# Patient Record
Sex: MALE | Race: OTHER | NOT HISPANIC OR LATINO
[De-identification: names, ages, dates, MRNs, and addresses within clinical notes are randomized per-mention and may not be internally consistent; named-entity substitution may affect disease eponyms.]

---

## 2021-04-05 ENCOUNTER — APPOINTMENT (OUTPATIENT)
Dept: DISASTER EMERGENCY | Facility: OTHER | Age: 66
End: 2021-04-05
Payer: COMMERCIAL

## 2021-04-05 PROCEDURE — 0001A: CPT

## 2021-04-26 ENCOUNTER — APPOINTMENT (OUTPATIENT)
Dept: DISASTER EMERGENCY | Facility: OTHER | Age: 66
End: 2021-04-26
Payer: COMMERCIAL

## 2021-04-26 PROCEDURE — 0002A: CPT

## 2024-05-01 ENCOUNTER — APPOINTMENT (OUTPATIENT)
Dept: PULMONOLOGY | Facility: CLINIC | Age: 69
End: 2024-05-01
Payer: MEDICARE

## 2024-05-01 VITALS
HEART RATE: 73 BPM | BODY MASS INDEX: 29.86 KG/M2 | DIASTOLIC BLOOD PRESSURE: 80 MMHG | TEMPERATURE: 97.7 F | HEIGHT: 68 IN | WEIGHT: 197 LBS | OXYGEN SATURATION: 97 % | SYSTOLIC BLOOD PRESSURE: 110 MMHG

## 2024-05-01 PROCEDURE — 99203 OFFICE O/P NEW LOW 30 MIN: CPT

## 2024-05-01 RX ORDER — DOXYCYCLINE HYCLATE 100 MG/1
100 CAPSULE ORAL
Qty: 14 | Refills: 0 | Status: ACTIVE | COMMUNITY
Start: 2024-05-01 | End: 1900-01-01

## 2024-05-01 NOTE — HISTORY OF PRESENT ILLNESS
[Never] : never [TextBox_4] : 68 year old patient presents for evaluation of chronic cough.  he has had cough for many years, now worse.   He has chest congestion and is producing yellow sputum.  Denies  history of obstructive airway disease denies pneumonia    He denies significant dyspnea.  Denies sinus problems, postnasal drip GERD   Primary doctor is Dr Neri Corbett     PSH:  hip repl  coronary stent       PMH:  CAD  stent, on ASA  clopidogrel HLD  HTN  on metoprolol  not on ACE I    Thal minor   SH:   never smoker   ETOH:  occasional     Occupation: retired, worked as doorman  Born [facility] No exposure to chemicals, dust, asbestos, mold        ALLERGY:   NKDA     environmental/seasonal allergy: honey, not pollen      Review of Systems:   No rash, skin problems No dry eyes no mouth ulcers no sinusitis, sinus infections, nasal obstruction no dysphagia no dry mouth      no obstructive airway disease  no pneumonia no wheeze no lung cancer    no chest pain no murmur no CHF  no edema   no peptic ulcer or gastritis no GERD no abdominal pain no liver disease   no Diabetes no thyroid disease    no bleeding   no DVT or PE   no kidney disease   no stroke no seizure

## 2024-05-01 NOTE — DISCUSSION/SUMMARY
[FreeTextEntry1] : 68 year old man who has chronic cough for many years Now presents due to increased cough, discolored sputum, congestion  He has prominen  rhonchi at left base, lesser on right.  No  history of obstructive airway disease had CXR may years ago and as told of small abnormality that  cleared on subsequent X ray (employment study)     he also had recent CX[place] 4[place]12[place]24 [place] [facility] [place] was  unremarkable by report  (Symptoms worsened afterward)  he also states that he took Z pack less than 2 months ago  has CAD with stent  Rue out bronchiectasis, rule out pneumonia  PLAN  Repeat CXR. CT chest i f needed  sputum culture   Based on CXR findings and report that symptoms have worsened, i will treat empirically with doxycycline 100 mg BID for 1 week, observe effect  close follow up  Further recommendations based on results.  Total time spent : 40 minutes Including: Preparation prior to visit - Reviewing prior record, results of tests and Consultation Reports as applicable Conducting an appropriate H & P during today's encounter Counseling patient  Note completion  med renewal discussing differential diagnosis    Paul Duron MD

## 2024-05-01 NOTE — PHYSICAL EXAM
[No Acute Distress] : no acute distress [Well Nourished] : well nourished [Well Developed] : well developed [Normal Oropharynx] : normal oropharynx [I] : Mallampati Class: I [Normal Appearance] : normal appearance [Supple] : supple [No Neck Mass] : no neck mass [No JVD] : no jvd [Normal Rate/Rhythm] : normal rate/rhythm [Normal S1, S2] : normal s1, s2 [No Resp Distress] : no resp distress [Benign] : benign [Not Tender] : not tender [No Masses] : no masses [No Edema] : no edema [No Focal Deficits] : no focal deficits [Oriented x3] : oriented x3 [Normal Affect] : normal affect [TextBox_68] : congested cough, rhonchi at left base, few at right base, possible mild wheeze

## 2024-05-03 ENCOUNTER — TRANSCRIPTION ENCOUNTER (OUTPATIENT)
Age: 69
End: 2024-05-03

## 2024-05-04 LAB — BACTERIA SPT CULT: NORMAL

## 2024-05-14 ENCOUNTER — APPOINTMENT (OUTPATIENT)
Dept: PULMONOLOGY | Facility: CLINIC | Age: 69
End: 2024-05-14
Payer: MEDICARE

## 2024-05-14 VITALS
OXYGEN SATURATION: 96 % | TEMPERATURE: 97.7 F | SYSTOLIC BLOOD PRESSURE: 106 MMHG | DIASTOLIC BLOOD PRESSURE: 70 MMHG | BODY MASS INDEX: 29.95 KG/M2 | WEIGHT: 197 LBS | HEART RATE: 83 BPM

## 2024-05-14 DIAGNOSIS — J98.59 OTHER DISEASES OF MEDIASTINUM, NOT ELSEWHERE CLASSIFIED: ICD-10-CM

## 2024-05-14 DIAGNOSIS — J20.9 ACUTE BRONCHITIS, UNSPECIFIED: ICD-10-CM

## 2024-05-14 PROCEDURE — 99214 OFFICE O/P EST MOD 30 MIN: CPT

## 2024-05-14 RX ORDER — AMOXICILLIN AND CLAVULANATE POTASSIUM 875; 125 MG/1; MG/1
875-125 TABLET, COATED ORAL
Qty: 14 | Refills: 0 | Status: ACTIVE | COMMUNITY
Start: 2024-05-14 | End: 1900-01-01

## 2024-05-14 RX ORDER — GUAIFENESIN 400 MG/1
400 TABLET ORAL
Qty: 30 | Refills: 0 | Status: ACTIVE | COMMUNITY
Start: 2024-05-14 | End: 1900-01-01

## 2024-05-14 RX ORDER — GREEN TEA/HOODIA GORDONII 315-12.5MG
10 CAPSULE ORAL
Qty: 30 | Refills: 0 | Status: ACTIVE | COMMUNITY
Start: 2024-05-14 | End: 1900-01-01

## 2024-05-14 NOTE — HISTORY OF PRESENT ILLNESS
[Never] : never [TextBox_4] : 68 year old patient presents for evaluation of chronic cough.  he has had cough for many years, now worse.   He has chest congestion and is producing yellow sputum.  Denies  history of obstructive airway disease denies pneumonia    He denies significant dyspnea.  Denies sinus problems, postnasal drip GERD   Primary doctor is Dr Neri Corbett     PSH:  hip repl  coronary stent       PMH:  CAD  stent, on ASA  clopidogrel HLD  HTN  on metoprolol  not on ACE I    Thal minor   SH:   never smoker   ETOH:  occasional     Occupation: retired, worked as doorman  Born [facility] No exposure to chemicals, dust, asbestos, mold        ALLERGY:   NKDA     environmental/seasonal allergy: honey, not pollen      Review of Systems:   No rash, skin problems No dry eyes no mouth ulcers no sinusitis, sinus infections, nasal obstruction no dysphagia no dry mouth      no obstructive airway disease  no pneumonia no wheeze no lung cancer    no chest pain no murmur no CHF  no edema   no peptic ulcer or gastritis no GERD no abdominal pain no liver disease   no Diabetes no thyroid disease    no bleeding   no DVT or PE   no kidney disease   no stroke no seizure

## 2024-05-14 NOTE — DISCUSSION/SUMMARY
[FreeTextEntry1] : 68 year old man who has chronic cough for many years Now presented due to increased cough, discolored sputum, congestion  He has prominent  rhonchi at left base, lesser on right.  No  history of obstructive airway disease had CXR may years ago and as told of small abnormality that  cleared on subsequent X ray (employment study)     he also had recent CXR 4/12/24 per records he provided At [facility] [place] This was  unremarkable by report  (Symptoms worsened afterward)  he also states that he took Z pack less than 2 months ago  has CAD with stent  Rue out bronchiectasis, rule out pneumonia   He was  treated empirically with doxycycline 100 mg BID for 1 week, observe effect   CXR repeated 05/01/24 and was  unremarkable  sputum culture  was  unremarkable   He states he has had no improvement  per my review of CXR it appears the right hilum is full/dense.  Rule out underlying mass Denies sinus symptoms  PLAN  Obtain NC CT chest   Treat with Amoxicillin/clavulanate  for 5-7 days   use expectorant reassess  close follow up  Further recommendations based on results.   discussed at length with the patient   Total time spent : 30 minutes Including: Preparation prior to visit - Reviewing prior record, results of tests and Consultation Reports as applicable Conducting an appropriate H & P during today's encounter Counseling patient  Note completion  med renewal discussing differential diagnosis    Paul Duron MD

## 2024-06-12 ENCOUNTER — LABORATORY RESULT (OUTPATIENT)
Age: 69
End: 2024-06-12

## 2024-06-12 ENCOUNTER — APPOINTMENT (OUTPATIENT)
Dept: PULMONOLOGY | Facility: CLINIC | Age: 69
End: 2024-06-12
Payer: MEDICARE

## 2024-06-12 VITALS
WEIGHT: 195 LBS | DIASTOLIC BLOOD PRESSURE: 74 MMHG | HEART RATE: 79 BPM | SYSTOLIC BLOOD PRESSURE: 120 MMHG | BODY MASS INDEX: 29.65 KG/M2 | OXYGEN SATURATION: 96 % | TEMPERATURE: 97 F

## 2024-06-12 DIAGNOSIS — R59.0 LOCALIZED ENLARGED LYMPH NODES: ICD-10-CM

## 2024-06-12 DIAGNOSIS — J18.9 PNEUMONIA, UNSPECIFIED ORGANISM: ICD-10-CM

## 2024-06-12 PROCEDURE — 99214 OFFICE O/P EST MOD 30 MIN: CPT

## 2024-06-12 NOTE — HISTORY OF PRESENT ILLNESS
[TextBox_4] : 68 year old patient presents for evaluation of chronic cough.  he has had cough for many years, now worse.   He has chest congestion and is producing yellow sputum.  Denies  history of obstructive airway disease denies pneumonia    He denies significant dyspnea.  Denies sinus problems, postnasal drip GERD   Primary doctor is Dr Neri Corbett     PSH:  hip repl  coronary stent       PMH:  CAD  stent, on ASA  clopidogrel HLD  HTN  on metoprolol  not on ACE I    Thal minor   SH:   never smoker   ETOH:  occasional     Occupation: retired, worked as doorman  Born [facility] No exposure to chemicals, dust, asbestos, mold        ALLERGY:   NKDA     environmental/seasonal allergy: honey, not pollen      Review of Systems:   No rash, skin problems No dry eyes no mouth ulcers no sinusitis, sinus infections, nasal obstruction no dysphagia no dry mouth      no obstructive airway disease  no pneumonia no wheeze no lung cancer    no chest pain no murmur no CHF  no edema   no peptic ulcer or gastritis no GERD no abdominal pain no liver disease   no Diabetes no thyroid disease    no bleeding   no DVT or PE   no kidney disease   no stroke no seizure

## 2024-06-12 NOTE — PROCEDURE
[FreeTextEntry1] : BRIAN FINNEY Date of Birth 1955 Procedure CT CHEST w/o contrast Study Date & Time 2024-05-23 5:03 PM Patient ID [id number] Accession Number [id number] Referring Physician DENISEALEXIS SHARMIN Institution Name [id number] Report RADIOLOGY REPORT FINDINGS FINDING PROCEDURE: CT CHEST WITHOUT CONTRAST CLINICAL INDICATION: Mediastinal mass and pulmonary nodules. TECHNIQUE: Non-ECG-gated spiral axial images of the chest were obtained without intravenous contrast. This study was performed using one or more of the following dose reduction techniques: automated exposure control, adjustment of the mA and/or kv according to patient size, or use of iterative reconstruction technique. COMPARISONS: Chest CT examination performed on August 13, 2019 and multiple prior studies dating back to the chest CT study performed on January 23, 2019. FINDINGS: Non-Cardiovascular Mediastinum: The imaged unenhanced thyroid gland, whose evaluation is limited due to beam hardening and streak artifact, is grossly unchanged. No pathologically enlarged lymph nodes are identified within the axillae, filomena, or mediastinum on this examination performed in the absence of intravenous contrast (multiple sub-centimeter lymph nodes throughout the thorax do not meet size criteria for enlargement and have not significantly changed since the prior study). Cardiovascular Mediastinum: Coronary arterial and aortic atherosclerotic calcifications are present, as are aortic valvular and mitral annular calcifications. The heart size is at the upper limit of normal. There is no pericardial effusion. Airways/Lungs/Pleura: The trachea and mainstem bronchi are grossly patent; however, more peripherally there is multi-focal endobronchial mucoid impaction. The lungs demonstrate multiple mild to moderate clustered reticulonodular pulmonary opacities most predominantly within the left lower lobe (with associated mild pulmonary parenchymal scarring, architectural distortion, and bronchiectasis), findings that 6/12/24, 2:21 PM Syn[employer] Mob[employer] [email or web address] have overall increased since August 13, 2019. A partially calcified 14 mm right upper lobe pulmonary nodule (series 4, image 73) has not significantly changed since August 13, 2019 and is most likely benign (possibly a pulmonary hamartoma). Multiple other pulmonary and fissural nodules measuring up to 4 mm in size are unchanged since August 13, 2019 and are most likely benign; as one selected example, there is an unchanged 4 mm nodule along the right major fissure (series 4, image 161). There are multiple sub-centimeter calcified granulomas within the lungs. There is no pleural effusion or pneumothorax on either side. Upper Abdomen: Limited evaluation of the partially imaged unenhanced upper abdomen demonstrates no acute abnormality. Bones and Soft Tissues: Diffuse osteopenia is noted. There are mild to moderate degenerative changes of the thoracic vertebral column. No suspicious lytic or blastic lesion is identified. IMPRESSION: Multiple mild to moderate clustered reticulonodular pulmonary opacities most predominantly within the left lower lobe (with associated mild pulmonary parenchymal scarring, architectural distortion, and bronchiectasis), findings that have overall increased since August 13, 2019 and for which the differential diagnosis includes (possibly acute superimposed upon chronic/recurrent) aspiration and/or bronchiolitis in the appropriate clinical context; please note that a follow-up low-dose chest CT examination without intravenous contrast is recommended in three to six months or as clinically indicated. Electronically signed by: Adam Bernheim MD 5/24/2024 9:32 AM Workstation: [id number] Electronically Signed By: Bernheim MD, Adam Sign Date: 24-MAY-24 [facility] Radiology

## 2024-06-12 NOTE — DISCUSSION/SUMMARY
[FreeTextEntry1] : 68 year old man who has chronic cough for many years Now presented due to increased cough, discolored sputum, congestion  He has prominent  rhonchi at left base, lesser on right.  No  history of obstructive airway disease had CXR may years ago and as told of small abnormality that  cleared on subsequent X ray (employment study)     he also had recent CXR 4/12/24 per records he provided At [facility] [place]? This was  unremarkable by report  (Symptoms worsened afterward)  he also states that he took Z pack less than 2 months ago  has CAD with stent  Rue out bronchiectasis, rule out pneumonia   He was  treated empirically with doxycycline 100 mg BID for 1 week, observe effect and subsequently with amoxicillin/clavulanate  he has improved sifnificantly  he had CT chest 5/24/24 that demonstrated multiple mild to moderate clustered reticulonodular pulmonary opacities most predominantly within the left lower lobe (with associated mild pulmonary parenchymal scarring, architectural distortion, and bronchiectasis), findings that have overall increased since August 13, 2019 and for which the differential diagnosis includes (possibly acute superimposed upon chronic/recurrent) aspiration and/or bronchiolitis   Ther is chronic stable RUL nodule likely benign  sputum culture  was  unremarkable   PLAN  use expectorant obtain blood work for allergies, hypersensitivity panel  bird feather antibody  panel  close follow up  repeat CT chest in several months  Further recommendations based on results.   discussed at length with the patient   Total time spent : 30 minutes Including: Preparation prior to visit - Reviewing prior record, results of tests and Consultation Reports as applicable Conducting an appropriate H & P during today's encounter Counseling patient  Note completion  med renewal discussing differential diagnosis    Paul Duron MD

## 2024-06-13 LAB
A ALTERNATA IGE QN: <0.1 KUA/L
A FUMIGATUS IGE QN: <0.1 KUA/L
ANISOCYTOSIS BLD QL: NORMAL
BASOPHILS # BLD AUTO: 0.09 K/UL
BASOPHILS NFR BLD AUTO: 0.9 %
BUDGERIGAR FEATHER (E78) CLASS: 0
BUDGERIGAR FEATHER (E78) CONC: <0.1 KUA/L
C ALBICANS IGE QN: <0.1 KUA/L
C HERBARUM IGE QN: <0.1 KUA/L
CAT DANDER IGE QN: <0.1 KUA/L
CHICKEN FEATHER IGE QN: <0.1 KUA/L
COMMON RAGWEED IGE QN: <0.1 KUA/L
D FARINAE IGE QN: 0.59 KUA/L
D PTERONYSS IGE QN: 0.15 KUA/L
DEPRECATED A ALTERNATA IGE RAST QL: 0 (ref 0–?)
DEPRECATED A FUMIGATUS IGE RAST QL: 0 (ref 0–?)
DEPRECATED C ALBICANS IGE RAST QL: 0
DEPRECATED C HERBARUM IGE RAST QL: 0 (ref 0–?)
DEPRECATED CAT DANDER IGE RAST QL: 0 (ref 0–?)
DEPRECATED CHICKEN FEATHER IGE RAST QL: 0
DEPRECATED COMMON RAGWEED IGE RAST QL: 0 (ref 0–?)
DEPRECATED D FARINAE IGE RAST QL: 1 (ref 0–?)
DEPRECATED DOG DANDER IGE RAST QL: 0 (ref 0–?)
DEPRECATED DUCK FEATHER IGE RAST QL: 0
DEPRECATED GOOSE FEATHER IGE RAST QL: 0
DEPRECATED M RACEMOSUS IGE RAST QL: 0
DEPRECATED ROACH IGE RAST QL: 2 (ref 0–?)
DEPRECATED TIMOTHY IGE RAST QL: NORMAL (ref 0–?)
DEPRECATED WHITE OAK IGE RAST QL: 1 (ref 0–?)
DOG DANDER IGE QN: <0.1 KUA/L
DUCK FEATHER IGE QN: <0.1 KUA/L
ELLIPTOCYTES BLD QL SMEAR: NORMAL
EOSINOPHIL # BLD AUTO: 0.34 K/UL
EOSINOPHIL # BLD MANUAL: 540 /UL
EOSINOPHIL NFR BLD AUTO: 3.4 %
GOOSE FEATHER IGE QN: <0.1 KUA/L
HCT VFR BLD CALC: 41.6 %
HGB BLD-MCNC: 12.8 G/DL
LYMPHOCYTES # BLD AUTO: 3.96 K/UL
LYMPHOCYTES NFR BLD AUTO: 39.3 %
M RACEMOSUS IGE QN: <0.1 KUA/L
MCHC RBC-ENTMCNC: 18.1 PG
MCHC RBC-ENTMCNC: 30.8 GM/DL
MCV RBC AUTO: 58.8 FL
MICROCYTES BLD QL SMEAR: NORMAL
MONOCYTES # BLD AUTO: 0.69 K/UL
MONOCYTES NFR BLD AUTO: 6.8 %
MSMEAR: NORMAL
NEUTROPHILS # BLD AUTO: 5 K/UL
NEUTROPHILS NFR BLD AUTO: 49.6 %
PLAT MORPH BLD: NORMAL
PLATELET # BLD AUTO: 202 K/UL
POIKILOCYTOSIS BLD QL SMEAR: NORMAL
RBC # BLD: 7.07 M/UL
RBC # FLD: 20.2 %
RBC BLD AUTO: ABNORMAL
ROACH IGE QN: 1.61 KUA/L
SCHISTOCYTES BLD QL SMEAR: SLIGHT
TIMOTHY IGE QN: 0.32 KUA/L
TOTAL IGE SMQN RAST: 185 KU/L
WBC # FLD AUTO: 10.08 K/UL
WHITE OAK IGE QN: 0.66 KUA/L

## 2024-06-14 LAB — DEPRECATED D PTERONYSS IGE RAST QL: NORMAL (ref 0–?)

## 2024-06-17 LAB
ALTERN TENCAPG(M6): 19.1 MCG/ML
ASPER FUMCAPG(M3): 66.3 MCG/ML
AUREOBASCAPG(M12): 16.6 MCG/ML
LACEYELLA SACCHARI IGG: 23.5 MCG/ML
MICROPOLYCAPG(M22): 15.3 MCG/ML
PENIC CHRYCAPG(M1): 59.5 MCG/ML
PHOMA BETAE IGG: 20.7 MCG/ML
TRICHODERMA VIRIDE IGG: 20.2 MCG/ML

## 2024-07-03 ENCOUNTER — APPOINTMENT (OUTPATIENT)
Dept: PULMONOLOGY | Facility: CLINIC | Age: 69
End: 2024-07-03
Payer: MEDICARE

## 2024-07-03 VITALS
SYSTOLIC BLOOD PRESSURE: 128 MMHG | DIASTOLIC BLOOD PRESSURE: 70 MMHG | WEIGHT: 196 LBS | HEART RATE: 84 BPM | TEMPERATURE: 96.8 F | OXYGEN SATURATION: 96 % | BODY MASS INDEX: 29.8 KG/M2

## 2024-07-03 DIAGNOSIS — J47.9 BRONCHIECTASIS, UNCOMPLICATED: ICD-10-CM

## 2024-07-03 DIAGNOSIS — J18.9 PNEUMONIA, UNSPECIFIED ORGANISM: ICD-10-CM

## 2024-07-03 DIAGNOSIS — T78.40XA ALLERGY, UNSPECIFIED, INITIAL ENCOUNTER: ICD-10-CM

## 2024-07-03 DIAGNOSIS — Z77.29 CONTACT WITH AND (SUSPECTED) EXPOSURE TO OTHER HAZARDOUS SUBSTANCES: ICD-10-CM

## 2024-07-03 PROCEDURE — 99215 OFFICE O/P EST HI 40 MIN: CPT

## 2024-07-03 RX ORDER — PREDNISONE 20 MG/1
20 TABLET ORAL DAILY
Qty: 5 | Refills: 0 | Status: ACTIVE | COMMUNITY
Start: 2024-07-03 | End: 1900-01-01

## 2024-07-04 PROBLEM — Z77.29 LONG-TERM EXPOSURE INVOLVING BIRD DROPPINGS: Status: ACTIVE | Noted: 2024-07-04

## 2024-07-04 PROBLEM — T78.40XA ALLERGY: Status: ACTIVE | Noted: 2024-07-04

## 2024-07-17 LAB — ACID FAST STN SPT: NORMAL

## 2024-07-22 LAB — ACID FAST STN SPT: ABNORMAL

## 2024-08-04 NOTE — REASON FOR VISIT
[Follow-Up] : a follow-up visit [Pneumonia] : pneumonia [Cough] : cough [Bronchiectasis] : bronchiectasis

## 2024-08-05 ENCOUNTER — APPOINTMENT (OUTPATIENT)
Dept: PULMONOLOGY | Facility: CLINIC | Age: 69
End: 2024-08-05

## 2024-08-05 PROBLEM — R91.1 INCIDENTAL LUNG NODULE, GREATER THAN OR EQUAL TO 8MM: Status: ACTIVE | Noted: 2024-08-05

## 2024-08-05 PROBLEM — A31.0 PULMONARY MAI (MYCOBACTERIUM AVIUM-INTRACELLULARE) INFECTION: Status: ACTIVE | Noted: 2024-08-05

## 2024-08-05 PROCEDURE — 99215 OFFICE O/P EST HI 40 MIN: CPT

## 2024-08-05 NOTE — HISTORY OF PRESENT ILLNESS
[Never] : never [TextBox_4] : 69 year old patient presents for evaluation of chronic cough.  he has had cough for many years, now worse.   He has chest congestion and is producing yellow sputum.  Denies  history of obstructive airway disease denies pneumonia    He denies significant dyspnea.  Denies sinus problems, postnasal drip GERD   Primary doctor is Dr Neri Corbett     PSH:  hip repl  coronary stent       PMH:  CAD  stent, on ASA  clopidogrel HLD  HTN  on metoprolol  not on ACE I    Thal minor   SH:   never smoker   ETOH:  occasional     Occupation: retired, worked as doorman  Born [facility] No exposure to chemicals, dust, asbestos, mold        ALLERGY:   NKDA     environmental/seasonal allergy: honey, not pollen      Review of Systems:   No rash, skin problems No dry eyes no mouth ulcers no sinusitis, sinus infections, nasal obstruction no dysphagia no dry mouth      no obstructive airway disease  no pneumonia no wheeze no lung cancer    no chest pain no murmur no CHF  no edema   no peptic ulcer or gastritis no GERD no abdominal pain no liver disease   no Diabetes no thyroid disease    no bleeding   no DVT or PE   no kidney disease   no stroke no seizure

## 2024-08-05 NOTE — HISTORY OF PRESENT ILLNESS
[Never] : never [TextBox_4] : 69 year old patient presents for evaluation of chronic cough.  he has had cough for many years, now worse.   He has chest congestion and is producing yellow sputum.  Denies  history of obstructive airway disease denies pneumonia    He denies significant dyspnea.  Denies sinus problems, postnasal drip GERD   Primary doctor is Dr Neri Corbett     PSH:  hip repl  coronary stent       PMH:  CAD  stent, on ASA  clopidogrel HLD  HTN  on metoprolol  not on ACE I    Thal minor   SH:   never smoker   ETOH:  occasional     Occupation: retired, worked as doorman  Born [facility] No exposure to chemicals, dust, asbestos, mold        ALLERGY:   NKDA     environmental/seasonal allergy: honey, not pollen      Review of Systems:   No rash, skin problems No dry eyes no mouth ulcers no sinusitis, sinus infections, nasal obstruction no dysphagia no dry mouth      no obstructive airway disease  no pneumonia no wheeze no lung cancer    no chest pain no murmur no CHF  no edema   no peptic ulcer or gastritis no GERD no abdominal pain no liver disease   no Diabetes no thyroid disease    no bleeding   no DVT or PE   no kidney disease   no stroke no seizure

## 2024-08-05 NOTE — PROCEDURE
[FreeTextEntry1] : BRIAN FINNEY Date of Birth 1955 Procedure CT CHEST w/o contrast Study Date & Time 2024-05-23 5:03 PM Patient ID [id number] Accession Number [id number] Referring Physician DENISEALEXIS SHARMIN Institution Name [facility] Report RADIOLOGY REPORT FINDINGS FINDING PROCEDURE: CT CHEST WITHOUT CONTRAST CLINICAL INDICATION: Mediastinal mass and pulmonary nodules. TECHNIQUE: Non-ECG-gated spiral axial images of the chest were obtained without intravenous contrast. This study was performed using one or more of the following dose reduction techniques: automated exposure control, adjustment of the mA and/or kv according to patient size, or use of iterative reconstruction technique. COMPARISONS: Chest CT examination performed on August 13, 2019 and multiple prior studies dating back to the chest CT study performed on January 23, 2019. FINDINGS: Non-Cardiovascular Mediastinum: The imaged unenhanced thyroid gland, whose evaluation is limited due to beam hardening and streak artifact, is grossly unchanged. No pathologically enlarged lymph nodes are identified within the axillae, filomena, or mediastinum on this examination performed in the absence of intravenous contrast (multiple sub-centimeter lymph nodes throughout the thorax do not meet size criteria for enlargement and have not significantly changed since the prior study). Cardiovascular Mediastinum: Coronary arterial and aortic atherosclerotic calcifications are present, as are aortic valvular and mitral annular calcifications. The heart size is at the upper limit of normal. There is no pericardial effusion. Airways/Lungs/Pleura: The trachea and mainstem bronchi are grossly patent; however, more peripherally there is multi-focal endobronchial mucoid impaction. The lungs demonstrate multiple mild to moderate clustered reticulonodular pulmonary opacities most predominantly within the left lower lobe (with associated mild pulmonary parenchymal scarring, architectural distortion, and bronchiectasis), findings that 6/12/24, 2:21 PM [employer] [email or web address] have overall increased since August 13, 2019. A partially calcified 14 mm right upper lobe pulmonary nodule (series 4, image 73) has not significantly changed since August 13, 2019 and is most likely benign (possibly a pulmonary hamartoma). Multiple other pulmonary and fissural nodules measuring up to 4 mm in size are unchanged since August 13, 2019 and are most likely benign; as one selected example, there is an unchanged 4 mm nodule along the right major fissure (series 4, image 161). There are multiple sub-centimeter calcified granulomas within the lungs. There is no pleural effusion or pneumothorax on either side. Upper Abdomen: Limited evaluation of the partially imaged unenhanced upper abdomen demonstrates no acute abnormality. Bones and Soft Tissues: Diffuse osteopenia is noted. There are mild to moderate degenerative changes of the thoracic vertebral column. No suspicious lytic or blastic lesion is identified. IMPRESSION: Multiple mild to moderate clustered reticulonodular pulmonary opacities most predominantly within the left lower lobe (with associated mild pulmonary parenchymal scarring, architectural distortion, and bronchiectasis), findings that have overall increased since August 13, 2019 and for which the differential diagnosis includes (possibly acute superimposed upon chronic/recurrent) aspiration and/or bronchiolitis in the appropriate clinical context; please note that a follow-up low-dose chest CT examination without intravenous contrast is recommended in three to six months or as clinically indicated. Electronically signed by: Adam Bernheim MD 5/24/2024 9:32 AM Workstation: [id number] Electronically Signed By: Bernheim MD, Adam Sign Date: 24-MAY-24 [facility] Radiology

## 2024-08-05 NOTE — PROCEDURE
[FreeTextEntry1] : BRIAN FINNEY Date of Birth 1955 Procedure CT CHEST w/o contrast Study Date & Time 2024-05-23 5:03 PM Patient ID [id number] Accession Number [id number] Referring Physician DENISEALEXIS SHARMIN Institution Name [facility] Report RADIOLOGY REPORT FINDINGS FINDING PROCEDURE: CT CHEST WITHOUT CONTRAST CLINICAL INDICATION: Mediastinal mass and pulmonary nodules. TECHNIQUE: Non-ECG-gated spiral axial images of the chest were obtained without intravenous contrast. This study was performed using one or more of the following dose reduction techniques: automated exposure control, adjustment of the mA and/or kv according to patient size, or use of iterative reconstruction technique. COMPARISONS: Chest CT examination performed on August 13, 2019 and multiple prior studies dating back to the chest CT study performed on January 23, 2019. FINDINGS: Non-Cardiovascular Mediastinum: The imaged unenhanced thyroid gland, whose evaluation is limited due to beam hardening and streak artifact, is grossly unchanged. No pathologically enlarged lymph nodes are identified within the axillae, filomena, or mediastinum on this examination performed in the absence of intravenous contrast (multiple sub-centimeter lymph nodes throughout the thorax do not meet size criteria for enlargement and have not significantly changed since the prior study). Cardiovascular Mediastinum: Coronary arterial and aortic atherosclerotic calcifications are present, as are aortic valvular and mitral annular calcifications. The heart size is at the upper limit of normal. There is no pericardial effusion. Airways/Lungs/Pleura: The trachea and mainstem bronchi are grossly patent; however, more peripherally there is multi-focal endobronchial mucoid impaction. The lungs demonstrate multiple mild to moderate clustered reticulonodular pulmonary opacities most predominantly within the left lower lobe (with associated mild pulmonary parenchymal scarring, architectural distortion, and bronchiectasis), findings that 6/12/24, 2:21 PM [employer] [email or web address] have overall increased since August 13, 2019. A partially calcified 14 mm right upper lobe pulmonary nodule (series 4, image 73) has not significantly changed since August 13, 2019 and is most likely benign (possibly a pulmonary hamartoma). Multiple other pulmonary and fissural nodules measuring up to 4 mm in size are unchanged since August 13, 2019 and are most likely benign; as one selected example, there is an unchanged 4 mm nodule along the right major fissure (series 4, image 161). There are multiple sub-centimeter calcified granulomas within the lungs. There is no pleural effusion or pneumothorax on either side. Upper Abdomen: Limited evaluation of the partially imaged unenhanced upper abdomen demonstrates no acute abnormality. Bones and Soft Tissues: Diffuse osteopenia is noted. There are mild to moderate degenerative changes of the thoracic vertebral column. No suspicious lytic or blastic lesion is identified. IMPRESSION: Multiple mild to moderate clustered reticulonodular pulmonary opacities most predominantly within the left lower lobe (with associated mild pulmonary parenchymal scarring, architectural distortion, and bronchiectasis), findings that have overall increased since August 13, 2019 and for which the differential diagnosis includes (possibly acute superimposed upon chronic/recurrent) aspiration and/or bronchiolitis in the appropriate clinical context; please note that a follow-up low-dose chest CT examination without intravenous contrast is recommended in three to six months or as clinically indicated. Electronically signed by: Adam Bernheim MD 5/24/2024 9:32 AM Workstation: [id number] Electronically Signed By: Bernheim MD, Adam Sign Date: 24-MAY-24 [facility] Radiology

## 2024-08-05 NOTE — DISCUSSION/SUMMARY
[FreeTextEntry1] : 69 year old man who has chronic cough for many years Now presented due to increased cough, discolored sputum, congestion  He has prominent  rhonchi at left base, lesser on right.  No  history of obstructive airway disease had CXR may years ago and as told of small abnormality that  cleared on subsequent X ray (employment study)     he also had recent CXR 4/12/24 per records he provided At [facility] [place]? This was  unremarkable by report  (Symptoms worsened afterward)  he also states that he took Z pack less than 2 months prior  has CAD with stent  He was  treated empirically with doxycycline 100 mg BID for 1 week, observe effect and subsequently with amoxicillin/clavulanate  he has improved significantly  he had CT chest 5/24/24 that demonstrated multiple mild to moderate clustered reticulonodular pulmonary opacities most predominantly within the left lower lobe (with associated mild pulmonary parenchymal scarring, architectural distortion, and bronchiectasis), findings that have overall increased since August 13, 2019 and for which the differential diagnosis includes (possibly acute superimposed upon chronic/recurrent) aspiration and/or bronchiolitis   Ther is chronic stable RUL nodule likely benign.  It is partially calcified, stable since 2019 sputum culture  was  unremarkable  However, sputum AFB studies demonstrated growth of JUAN A on 2 of 3 specimens  he feels better after course of doxycycline an continued use of Aerobika  he states he has mild purulent sputum   He has elevated IgE, asthma profile  sensitive to dust mite, oak, españa  Hypersensitivity panel shows elevated IgG to multiple mold species, raising possibility of hypersensitivity pneumonitis  he has been breeding pigeons for many years  Feather panel negative  but non specific as did not test for pigeons  Eosinophils elevated   540  he has allergy history, seen several years ago by allergist but results were not very significant according to his wife who accompanies him today  He is using Aerobika butstill has purulent sputum.  he has bronchiectasis primarily in th eleft lower lobe  He has had incomplete response to Aerobika OPEP device  He has had productive cough for greater than 6 months   PLAN  repeat sputum AFB X2  I will order  mechanical  Oscillatory sputum clearance  device in effort to  clear JUAN A without need for prolonged antibiotic therapy  I have completed all necessary documents to obtain device  he has tried and failed Aerobika  Monitor sputum  maximize sputum clearance techniques  May need to treat for pulmonary JUAN A if sputum remains AFB/JUAN A positive  use inhaled bronchodilator  use expectorant I recommended he test home for mold  (states he has not detected any mold)  avoid exposure to pigeons  Will investigate other testing to evaluate reaction or effect of pigeon exposure  repeat CT chest in several months.   The order has been placed.  Pt is agreeable.   Further recommendations based on results.  Reviewed literature on testing for pigeon breeders lung and HSP related to bird exposure  discussed at length with the patient   Total time spent : 40 minutes Including: Preparation prior to visit - Reviewing prior record, results of tests and Consultation Reports as applicable Conducting an appropriate H & P during today's encounter Counseling patient  Note completion  med renewal discussing differential diagnosis    Paul Duron MD

## 2024-08-05 NOTE — DISCUSSION/SUMMARY
[FreeTextEntry1] : 69 year old man who has chronic cough for many years Now presented due to increased cough, discolored sputum, congestion  He has prominent  rhonchi at left base, lesser on right.  No  history of obstructive airway disease had CXR may years ago and as told of small abnormality that  cleared on subsequent X ray (employment study)     he also had recent CXR 4/12/24 per records he provided At [facility] [place]? This was  unremarkable by report  (Symptoms worsened afterward)  he also states that he took Z pack less than 2 months prior  has CAD with stent  He was  treated empirically with doxycycline 100 mg BID for 1 week, observe effect and subsequently with amoxicillin/clavulanate  he has improved significantly  he had CT chest 5/24/24 that demonstrated multiple mild to moderate clustered reticulonodular pulmonary opacities most predominantly within the left lower lobe (with associated mild pulmonary parenchymal scarring, architectural distortion, and bronchiectasis), findings that have overall increased since August 13, 2019 and for which the differential diagnosis includes (possibly acute superimposed upon chronic/recurrent) aspiration and/or bronchiolitis   Ther is chronic stable RUL nodule likely benign.  It is partially calcified, stable since 2019 sputum culture  was  unremarkable  However, sputum AFB studies demonstrated growth of JUAN A on 2 of 3 specimens  he feels better after course of doxycycline an continued use of Aerobika  he states he has mild purulent sputum   He has elevated IgE, asthma profile  sensitive to dust mite, oak, españa  Hypersensitivity panel shows elevated IgG to multiple mold species, raising possibility of hypersensitivity pneumonitis  he has been breeding pigeons for many years  Feather panel negative  but non specific as did not test for pigeons  Eosinophils elevated   540  he has allergy history, seen several years ago by allergist but results were not very significant according to his wife who accompanies him today  He is using Aerobika butstill has purulent sputum.  he has bronchiectasis primarily in th eleft lower lobe  He has had incomplete response to Aerobika OPEP device  He has had productive cough for greater than 6 months   PLAN  repeat sputum AFB X2  I will order  mechanical  Oscillatory sputum clearance  device in effort to  clear JUAN A without need for prolonged antibiotic therapy  I have completed all necessary documents to obtain device  he has tried and failed Aerobika  Monitor sputum  maximize sputum clearance techniques  May need to treat for pulmonary JUAN A if sputum remains AFB/JUAN A positive  use inhaled bronchodilator  use expectorant I recommended he test home for mold  (states he has not detected any mold)  avoid exposure to pigeons  Will investigate other testing to evaluate reaction or effect of pigeon exposure  repeat CT chest in several months.   The order has been placed.  Pt is agreeable.   Further recommendations based on results.  Reviewed literature on testing for pigeon breeders lung and HSP related to bird exposure  discussed at length with the patient   Total time spent : 40 minutes Including: Preparation prior to visit - Reviewing prior record, results of tests and Consultation Reports as applicable Conducting an appropriate H & P during today's encounter Counseling patient  Note completion  med renewal discussing differential diagnosis    Paul Duron MD

## 2024-08-06 ENCOUNTER — APPOINTMENT (OUTPATIENT)
Dept: PULMONOLOGY | Facility: CLINIC | Age: 69
End: 2024-08-06

## 2024-09-25 ENCOUNTER — APPOINTMENT (OUTPATIENT)
Dept: PULMONOLOGY | Facility: CLINIC | Age: 69
End: 2024-09-25

## 2024-10-07 ENCOUNTER — NON-APPOINTMENT (OUTPATIENT)
Age: 69
End: 2024-10-07

## 2024-10-07 ENCOUNTER — APPOINTMENT (OUTPATIENT)
Dept: PULMONOLOGY | Facility: CLINIC | Age: 69
End: 2024-10-07
Payer: MEDICARE

## 2024-10-07 VITALS
HEART RATE: 83 BPM | OXYGEN SATURATION: 96 % | SYSTOLIC BLOOD PRESSURE: 120 MMHG | TEMPERATURE: 96 F | DIASTOLIC BLOOD PRESSURE: 74 MMHG | BODY MASS INDEX: 29.8 KG/M2 | WEIGHT: 196 LBS

## 2024-10-07 DIAGNOSIS — Z77.29 CONTACT WITH AND (SUSPECTED) EXPOSURE TO OTHER HAZARDOUS SUBSTANCES: ICD-10-CM

## 2024-10-07 DIAGNOSIS — A31.0 PULMONARY MYCOBACTERIAL INFECTION: ICD-10-CM

## 2024-10-07 DIAGNOSIS — R91.1 SOLITARY PULMONARY NODULE: ICD-10-CM

## 2024-10-07 DIAGNOSIS — J47.9 BRONCHIECTASIS, UNCOMPLICATED: ICD-10-CM

## 2024-10-07 PROCEDURE — 99214 OFFICE O/P EST MOD 30 MIN: CPT

## 2024-11-04 ENCOUNTER — APPOINTMENT (OUTPATIENT)
Dept: PULMONOLOGY | Facility: CLINIC | Age: 69
End: 2024-11-04

## 2025-02-03 ENCOUNTER — APPOINTMENT (OUTPATIENT)
Dept: PULMONOLOGY | Facility: CLINIC | Age: 70
End: 2025-02-03
Payer: MEDICARE

## 2025-02-03 ENCOUNTER — NON-APPOINTMENT (OUTPATIENT)
Age: 70
End: 2025-02-03

## 2025-02-03 VITALS
DIASTOLIC BLOOD PRESSURE: 76 MMHG | HEIGHT: 68 IN | TEMPERATURE: 97.6 F | OXYGEN SATURATION: 95 % | WEIGHT: 195 LBS | BODY MASS INDEX: 29.55 KG/M2 | HEART RATE: 84 BPM | SYSTOLIC BLOOD PRESSURE: 130 MMHG

## 2025-02-03 DIAGNOSIS — J47.9 BRONCHIECTASIS, UNCOMPLICATED: ICD-10-CM

## 2025-02-03 DIAGNOSIS — A31.0 PULMONARY MYCOBACTERIAL INFECTION: ICD-10-CM

## 2025-02-03 DIAGNOSIS — R91.1 SOLITARY PULMONARY NODULE: ICD-10-CM

## 2025-02-03 PROCEDURE — 99214 OFFICE O/P EST MOD 30 MIN: CPT

## 2025-02-03 PROCEDURE — G2211 COMPLEX E/M VISIT ADD ON: CPT

## 2025-08-04 ENCOUNTER — APPOINTMENT (OUTPATIENT)
Dept: PULMONOLOGY | Facility: CLINIC | Age: 70
End: 2025-08-04
Payer: MEDICARE

## 2025-08-04 VITALS
TEMPERATURE: 96.4 F | WEIGHT: 196 LBS | BODY MASS INDEX: 29.8 KG/M2 | HEART RATE: 78 BPM | DIASTOLIC BLOOD PRESSURE: 72 MMHG | SYSTOLIC BLOOD PRESSURE: 126 MMHG | OXYGEN SATURATION: 96 %

## 2025-08-04 DIAGNOSIS — J47.9 BRONCHIECTASIS, UNCOMPLICATED: ICD-10-CM

## 2025-08-04 DIAGNOSIS — A31.0 PULMONARY MYCOBACTERIAL INFECTION: ICD-10-CM

## 2025-08-04 PROCEDURE — 99214 OFFICE O/P EST MOD 30 MIN: CPT

## 2025-08-04 PROCEDURE — G2211 COMPLEX E/M VISIT ADD ON: CPT
